# Patient Record
Sex: MALE | Race: WHITE | HISPANIC OR LATINO | Employment: FULL TIME | URBAN - METROPOLITAN AREA
[De-identification: names, ages, dates, MRNs, and addresses within clinical notes are randomized per-mention and may not be internally consistent; named-entity substitution may affect disease eponyms.]

---

## 2023-08-29 ENCOUNTER — OFFICE VISIT (OUTPATIENT)
Dept: URGENT CARE | Facility: CLINIC | Age: 28
End: 2023-08-29
Payer: COMMERCIAL

## 2023-08-29 VITALS — TEMPERATURE: 98.4 F | HEART RATE: 95 BPM | OXYGEN SATURATION: 98 % | RESPIRATION RATE: 18 BRPM | WEIGHT: 128 LBS

## 2023-08-29 DIAGNOSIS — U07.1 COVID-19: Primary | ICD-10-CM

## 2023-08-29 PROCEDURE — 99203 OFFICE O/P NEW LOW 30 MIN: CPT | Performed by: STUDENT IN AN ORGANIZED HEALTH CARE EDUCATION/TRAINING PROGRAM

## 2023-08-29 NOTE — PROGRESS NOTES
[facility]        NAME: Carmencita Dodge is a 33 y.o. male  : 1995    MRN: [id number]  DATE: 2023  TIME: 12:35 PM    Assessment and Orders   COVID-19 [U07.1]  1. COVID-19              Plan and Discussion      Patient tested postive for COVID-19 at home today. Discussed current CDC guidelines. Patient was informed that they must remain at home on self isolation for 5 days since symptom onset and continue to wear a mask around others for an additional 5 days after that. Patient was informed that they must be fever free for at least 24 hours without medications prior to discontinuing isolation. Patient has been instructed to rest at home, drink plenty of fluids, take Tylenol or Motrin as needed, drink warm tea w/ lemon and honey, run a humidifier at home, and take a multivitamin daily. If symptoms persist despite treatment, patient is to follow up with PCP for re-check. If symptoms worsen,or any new symptoms present including but not limited to, fever, chest pain, shortness of breath, patient is to be seen in the ER. Patient verbalizes understanding and agrees w/ treatment plan. Patient symptoms are well controlled with over-the-counter therapy. Work note provided. Discussed ED precautions including (but not limited to)  • Difficultly breathing or shortness of breath  • Chest pain  • Acutely worsening symptoms. Risks and benefits discussed. Patient understands and agrees with the plan. Follow up with PCP. Chief Complaint     Chief Complaint   Patient presents with   • Cold Like Symptoms     4 days stuffy head runny nose called out of work. Need work note. Dayquil         History of Present Illness       Sinusitis  This is a new problem. The current episode started in the past 7 days (Symptoms started Saturday). The problem has been gradually improving since onset. There has been no fever.  Associated symptoms include chills, congestion, coughing, headaches, a hoarse voice and sinus pressure. Pertinent negatives include no shortness of breath or sore throat. Past treatments include oral decongestants and acetaminophen. The treatment provided moderate relief. Review of Systems   Review of Systems   Constitutional: Positive for chills. HENT: Positive for congestion, hoarse voice and sinus pressure. Negative for sore throat. Respiratory: Positive for cough. Negative for shortness of breath. Neurological: Positive for headaches. Current Medications     No current outpatient medications on file. Current Allergies     Allergies as of 08/29/2023 - Reviewed 08/29/2023   Allergen Reaction Noted   • Hall cherry bismuth [bismuth subsalicylate] Itching 98/52/4502            The following portions of the patient's history were reviewed and updated as appropriate: allergies, current medications, past family history, past medical history, past social history, past surgical history and problem list.     Past Medical History:   Diagnosis Date   • Asthma        Past Surgical History:   Procedure Laterality Date   • NO PAST SURGERIES         No family history on file. Medications have been verified. Objective   Pulse 95   Temp 98.4 °F (36.9 °C)   Resp 18   Wt 58.1 kg (128 lb)   SpO2 98%   No LMP for male patient. Physical Exam     Physical Exam  Constitutional:       General: He is not in acute distress. Appearance: He is not ill-appearing. HENT:      Nose: Congestion present. Cardiovascular:      Rate and Rhythm: Normal rate. Pulmonary:      Effort: No respiratory distress. Breath sounds: No wheezing or rhonchi. Neurological:      General: No focal deficit present. Mental Status: He is alert and oriented to person, place, and time.    Psychiatric:         Mood and Affect: Mood normal.         Behavior: Behavior normal.               Deb Worrell DO

## 2023-08-29 NOTE — LETTER
[facility] [id number] [facility] [id number]  Dept: [phone]    August 29, 2023    Patient: Brittney Barrientos  YOB: 1995    They may return to work on 8/31/2023, as this is 5 days from the onset of symptoms. Upon return, they must then adhere to strict masking for an additional 5 days.     Sincerely,    Luna Rainey, DO